# Patient Record
Sex: FEMALE
[De-identification: names, ages, dates, MRNs, and addresses within clinical notes are randomized per-mention and may not be internally consistent; named-entity substitution may affect disease eponyms.]

---

## 2020-12-02 ENCOUNTER — APPOINTMENT (OUTPATIENT)
Dept: PLASTIC SURGERY | Facility: CLINIC | Age: 55
End: 2020-12-02
Payer: MEDICARE

## 2020-12-02 VITALS
TEMPERATURE: 97.5 F | DIASTOLIC BLOOD PRESSURE: 86 MMHG | WEIGHT: 112 LBS | HEIGHT: 67 IN | HEART RATE: 80 BPM | BODY MASS INDEX: 17.58 KG/M2 | SYSTOLIC BLOOD PRESSURE: 148 MMHG

## 2020-12-02 DIAGNOSIS — I10 ESSENTIAL (PRIMARY) HYPERTENSION: ICD-10-CM

## 2020-12-02 DIAGNOSIS — E88.1 LIPODYSTROPHY, NOT ELSEWHERE CLASSIFIED: ICD-10-CM

## 2020-12-02 DIAGNOSIS — Z92.3 PERSONAL HISTORY OF IRRADIATION: ICD-10-CM

## 2020-12-02 PROBLEM — Z00.00 ENCOUNTER FOR PREVENTIVE HEALTH EXAMINATION: Status: ACTIVE | Noted: 2020-12-02

## 2020-12-02 PROCEDURE — 99072 ADDL SUPL MATRL&STAF TM PHE: CPT

## 2020-12-02 PROCEDURE — 99204 OFFICE O/P NEW MOD 45 MIN: CPT

## 2020-12-11 PROBLEM — E88.1 LIPODYSTROPHY: Status: ACTIVE | Noted: 2020-12-11

## 2020-12-11 PROBLEM — Z92.3 HISTORY OF IRRADIATION, PRESENTING HAZARDS TO HEALTH: Status: RESOLVED | Noted: 2020-12-11 | Resolved: 2020-12-11

## 2020-12-11 PROBLEM — I10 HYPERTENSION: Status: ACTIVE | Noted: 2020-12-11

## 2020-12-11 RX ORDER — DOLUTEGRAVIR SODIUM 50 MG/1
TABLET, FILM COATED ORAL
Refills: 0 | Status: ACTIVE | COMMUNITY

## 2020-12-11 RX ORDER — DARUNAVIR ETHANOLATE AND COBICISTAT 800; 150 MG/1; MG/1
800-150 TABLET, FILM COATED ORAL
Refills: 0 | Status: ACTIVE | COMMUNITY

## 2020-12-11 RX ORDER — AMLODIPINE BESYLATE 5 MG/1
TABLET ORAL
Refills: 0 | Status: ACTIVE | COMMUNITY

## 2020-12-11 RX ORDER — ESTRADIOL 10 UG/1
TABLET, FILM COATED VAGINAL
Refills: 0 | Status: ACTIVE | COMMUNITY

## 2020-12-11 RX ORDER — QUETIAPINE FUMARATE 100 MG/1
100 TABLET ORAL
Refills: 0 | Status: ACTIVE | COMMUNITY

## 2020-12-11 NOTE — ASSESSMENT
[FreeTextEntry1] : Pt is a potential candidate for fat transfer to the buttocks and hips for feminizing body contouring. She is at increased medical risk given her history of renal insufficiency. We will need to see her last CD4 count, viral load, and GFR prior to submitting to insurance.

## 2020-12-11 NOTE — HISTORY OF PRESENT ILLNESS
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 She is specifically looking for fat transfer from her abdomen to her hips and buttocks, to help give her a more feminine appearance. She has not had any other body contouring, fat or silicone injections, or liposuction procedures. \par \par She is currently on treatment for HIV. 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 She also states she had an infection which required radical orchiectomy. \par \par She is on disability. She lives with her partner, Ganesh White, who she states will be available to assist her after surgery. She smokes weed, agrees to switch to edibles. She denies nicotine use or drinking alcohol.

## 2020-12-11 NOTE — PHYSICAL EXAM
[de-identified] : NAD. BMI 17.5 [de-identified] : Grade 2 ptosis [de-identified] : No visible scars. No palpable herniae. Moderate truncal obesity with significant subcutaneous soft tissue. [de-identified] : deflated-appearing hips and buttocks without visible scarring [de-identified] : Head: NC/AT\par Eyes: sclerae clear, EOMI\par ENT: hearing grossly normal, no gross nasal deformity\par Resp: normal respiratory effort, no accessory muscle use\par MSK: normal gait and posture\par Skin: no significant visible lesions\par Psych: normal affect, appropriate

## 2020-12-11 NOTE — REASON FOR VISIT
[Consultation] : a consultation visit [FreeTextEntry1] : Patient presents to the office at the request of Dr Hopkins for a body contouring consult.

## 2021-01-26 ENCOUNTER — APPOINTMENT (OUTPATIENT)
Dept: PLASTIC SURGERY | Facility: CLINIC | Age: 56
End: 2021-01-26

## 2021-01-29 ENCOUNTER — APPOINTMENT (OUTPATIENT)
Dept: PLASTIC SURGERY | Facility: HOSPITAL | Age: 56
End: 2021-01-29

## 2021-01-29 ENCOUNTER — APPOINTMENT (OUTPATIENT)
Dept: PLASTIC SURGERY | Facility: CLINIC | Age: 56
End: 2021-01-29
Payer: MEDICARE

## 2021-01-29 ENCOUNTER — APPOINTMENT (OUTPATIENT)
Dept: PLASTIC SURGERY | Facility: CLINIC | Age: 56
End: 2021-01-29

## 2021-01-29 DIAGNOSIS — N28.9 DISORDER OF KIDNEY AND URETER, UNSPECIFIED: ICD-10-CM

## 2021-01-29 DIAGNOSIS — B20 HUMAN IMMUNODEFICIENCY VIRUS [HIV] DISEASE: ICD-10-CM

## 2021-01-29 DIAGNOSIS — F64.0 TRANSSEXUALISM: ICD-10-CM

## 2021-01-29 PROCEDURE — 99441: CPT

## 2021-02-10 ENCOUNTER — APPOINTMENT (OUTPATIENT)
Dept: PLASTIC SURGERY | Facility: CLINIC | Age: 56
End: 2021-02-10